# Patient Record
Sex: MALE | Race: WHITE
[De-identification: names, ages, dates, MRNs, and addresses within clinical notes are randomized per-mention and may not be internally consistent; named-entity substitution may affect disease eponyms.]

---

## 2020-01-30 ENCOUNTER — HOSPITAL ENCOUNTER (EMERGENCY)
Dept: HOSPITAL 35 - ER | Age: 45
LOS: 1 days | Discharge: ANOTHER HOSPITAL | End: 2020-01-31
Payer: COMMERCIAL

## 2020-01-30 VITALS — DIASTOLIC BLOOD PRESSURE: 113 MMHG | SYSTOLIC BLOOD PRESSURE: 164 MMHG

## 2020-01-30 VITALS — HEIGHT: 62 IN | BODY MASS INDEX: 31.28 KG/M2 | WEIGHT: 170 LBS

## 2020-01-30 DIAGNOSIS — I62.9: Primary | ICD-10-CM

## 2020-01-30 DIAGNOSIS — Z79.899: ICD-10-CM

## 2020-01-30 DIAGNOSIS — F10.231: ICD-10-CM

## 2020-01-31 VITALS — SYSTOLIC BLOOD PRESSURE: 140 MMHG | DIASTOLIC BLOOD PRESSURE: 98 MMHG

## 2020-01-31 LAB
ALBUMIN SERPL-MCNC: 3.9 G/DL (ref 3.4–5)
ALT SERPL-CCNC: 123 U/L (ref 30–65)
ANION GAP SERPL CALC-SCNC: 15 MMOL/L (ref 7–16)
APTT BLD: 25.3 SECONDS (ref 24.5–32.8)
AST SERPL-CCNC: 142 U/L (ref 15–37)
BASOPHILS NFR BLD AUTO: 0.9 % (ref 0–2)
BILIRUB DIRECT SERPL-MCNC: 0.5 MG/DL
BILIRUB SERPL-MCNC: 1.1 MG/DL
BUN SERPL-MCNC: 5 MG/DL (ref 7–18)
CALCIUM SERPL-MCNC: 8.9 MG/DL (ref 8.5–10.1)
CHLORIDE SERPL-SCNC: 94 MMOL/L (ref 98–107)
CO2 SERPL-SCNC: 20 MMOL/L (ref 21–32)
CREAT SERPL-MCNC: 1.1 MG/DL (ref 0.7–1.3)
EOSINOPHIL NFR BLD: 0.3 % (ref 0–3)
ERYTHROCYTE [DISTWIDTH] IN BLOOD BY AUTOMATED COUNT: 13.6 % (ref 10.5–14.5)
GLUCOSE SERPL-MCNC: 175 MG/DL (ref 74–106)
GRANULOCYTES NFR BLD MANUAL: 75.7 % (ref 36–66)
HCT VFR BLD CALC: 41.9 % (ref 42–52)
HGB BLD-MCNC: 14.3 GM/DL (ref 14–18)
INR PPP: 1
LYMPHOCYTES NFR BLD AUTO: 11.5 % (ref 24–44)
MCH RBC QN AUTO: 33.1 PG (ref 26–34)
MCHC RBC AUTO-ENTMCNC: 34 G/DL (ref 28–37)
MCV RBC: 97.2 FL (ref 80–100)
MONOCYTES NFR BLD: 11.6 % (ref 1–8)
NEUTROPHILS # BLD: 7.7 THOU/UL (ref 1.4–8.2)
PLATELET # BLD: 195 THOU/UL (ref 150–400)
POTASSIUM SERPL-SCNC: 3.4 MMOL/L (ref 3.5–5.1)
PROT SERPL-MCNC: 8.5 G/DL (ref 6.4–8.2)
PROTHROMBIN TIME: 10.3 SECONDS (ref 9.3–11.4)
RBC # BLD AUTO: 4.31 MIL/UL (ref 4.5–6)
SODIUM SERPL-SCNC: 129 MMOL/L (ref 136–145)
WBC # BLD AUTO: 10.2 THOU/UL (ref 4–11)

## 2020-01-31 NOTE — NUR
CHARGE NURSE SPOKE  PT'S FRIEND MICKEY AND GAVE AN UPDATE AND TOLD HER OF
 PLANS TO TRANSFER PT  TO .

## 2020-01-31 NOTE — NUR
PT HAD INCONTINENT OF URINE FOLLOWING SEIZURE, PT RESTLESS AND CONFUSED, UNABLE
TO HOLD STILL FOR CT OF HEAD, ANOTHER 1MG LOREZEPAM GIVEN PER ORDERS. CT
OF HEAD COMPLETED.

## 2020-01-31 NOTE — NUR
NOTICED HR UP 'S , CHECKED ON PT FOUND HIM SEIZING EYES CLOSED, TEETH
CLENCED, ALL EXTREMITIES RIGID, ALSO BLEEDING FROM MOUTH EPISODE
 LASTED LESS THAN A MINUTE.DR VILLEDA WAS NOTIFIED ATIVAN I MG GIVEN . PT SOMEWHAT
RESTLESS FOLLOWING SEIZURE.